# Patient Record
Sex: FEMALE | Race: BLACK OR AFRICAN AMERICAN | NOT HISPANIC OR LATINO | URBAN - METROPOLITAN AREA
[De-identification: names, ages, dates, MRNs, and addresses within clinical notes are randomized per-mention and may not be internally consistent; named-entity substitution may affect disease eponyms.]

---
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
## 2022-03-26 NOTE — ED PROVIDER NOTE - ATTENDING CONTRIBUTION TO CARE
IMPROVE-DD Application Not Available 17F no PMH c/o chest pain today.  +recent cough, runny nose. episode of posttussive emesis tonight.  no fevers, no abd pain. no sick contacts. no recent travel.  gen- nad  heent- ncat, clear conj  cv -rrr  lungs -ctab  abd - soft, nt, nd  ext -wwp, no edema  neuro -aox3, steady gait, ricardo  no acute st/t wave changes on EKG, no resp distress, PERC negative, no infiltrate on CXR. recommend pediatrician f/u